# Patient Record
Sex: FEMALE | NOT HISPANIC OR LATINO | URBAN - METROPOLITAN AREA
[De-identification: names, ages, dates, MRNs, and addresses within clinical notes are randomized per-mention and may not be internally consistent; named-entity substitution may affect disease eponyms.]

---

## 2021-04-21 ENCOUNTER — OFFICE VISIT (OUTPATIENT)
Dept: URBAN - METROPOLITAN AREA CLINIC 35 | Facility: CLINIC | Age: 71
End: 2021-04-21

## 2021-04-21 VITALS
SYSTOLIC BLOOD PRESSURE: 118 MMHG | HEART RATE: 68 BPM | OXYGEN SATURATION: 96 % | DIASTOLIC BLOOD PRESSURE: 78 MMHG | WEIGHT: 180 LBS | HEIGHT: 68 IN | BODY MASS INDEX: 27.28 KG/M2 | TEMPERATURE: 98.7 F

## 2021-04-21 RX ORDER — SIMVASTATIN 20 MG/1
TAKE ONE TABLET BY MOUTH ONE TIME DAILY TABLET, FILM COATED ORAL
Qty: 90 UNSPECIFIED | Refills: 4 | Status: ACTIVE | COMMUNITY

## 2021-04-21 RX ORDER — CITALOPRAM HYDROBROMIDE 20 MG/1
1 TABLET TABLET ORAL ONCE A DAY
Status: ON HOLD | COMMUNITY

## 2021-04-21 RX ORDER — PANTOPRAZOLE SODIUM 40 MG/1
TAKE ONE TABLET BY MOUTH ONE TIME DAILY TABLET, DELAYED RELEASE ORAL
Qty: 30 UNSPECIFIED | Refills: 0 | Status: DISCONTINUED | COMMUNITY

## 2021-04-21 RX ORDER — CIPROFLOXACIN 500 MG/1
TAKE ONE TABLET BY MOUTH TWICE A DAY TABLET, FILM COATED ORAL
Qty: 14 UNSPECIFIED | Refills: 0 | Status: ACTIVE | COMMUNITY
End: 2021-04-22

## 2021-04-21 RX ORDER — OMEPRAZOLE 20 MG/1
1 CAPSULE CAPSULE, DELAYED RELEASE ORAL ONCE A DAY
Qty: 90 CAPSULE | Status: ACTIVE | COMMUNITY

## 2021-04-21 RX ORDER — ZOLPIDEM TARTRATE 10 MG/1
1 TABLET AT BEDTIME AS NEEDED TABLET, FILM COATED ORAL ONCE A DAY
Status: ACTIVE | COMMUNITY

## 2021-04-21 RX ORDER — SUCRALFATE 1 G/1
TAKE ONE TABLET BY MOUTH FOUR TIMES A DAY AS NEEDED FOR PAIN, TAKE 1 HOUR BEFORE MEALS AND AT BEDTIME TABLET ORAL
Qty: 30 UNSPECIFIED | Refills: 0 | Status: DISCONTINUED | COMMUNITY

## 2021-04-21 RX ORDER — EZETIMIBE 10 MG/1
TAKE ONE TABLET BY MOUTH ONE TIME DAILY TABLET ORAL
Qty: 90 UNSPECIFIED | Refills: 4 | Status: ACTIVE | COMMUNITY

## 2021-04-21 RX ORDER — LEVOTHYROXINE SODIUM 75 UG/1
TAKE ONE TABLET BY MOUTH ONE TIME DAILY TABLET ORAL
Qty: 90 UNSPECIFIED | Refills: 1 | Status: ACTIVE | COMMUNITY

## 2021-04-21 RX ORDER — DICYCLOMINE HYDROCHLORIDE 20 MG/1
1 TABLET TABLET ORAL
Qty: 30 | Refills: 0 | OUTPATIENT
Start: 2021-04-21

## 2021-04-21 RX ORDER — SERTRALINE HYDROCHLORIDE 100 MG/1
1 TABLET TABLET ORAL ONCE A DAY
Qty: 30 | Status: ACTIVE | COMMUNITY

## 2021-04-21 RX ORDER — LORAZEPAM 2 MG/1
1 TABLET AT BEDTIME AS NEEDED TABLET ORAL ONCE A DAY
Status: ACTIVE | COMMUNITY

## 2021-04-21 RX ORDER — AMLODIPINE BESYLATE AND BENAZEPRIL HYDROCHLORIDE 2.5; 1 MG/1; MG/1
1 CAPSULE CAPSULE ORAL ONCE A DAY
Status: ON HOLD | COMMUNITY

## 2021-04-21 RX ORDER — AMLODIPINE BESYLATE 10 MG/1
1 TABLET TABLET ORAL ONCE A DAY
Qty: 30 | Status: ACTIVE | COMMUNITY

## 2021-04-21 NOTE — EXAM-MIGRATED EXAMINATIONS
GENERAL APPEARANCE: - alert, in no acute distress, well developed, well nourished;   HEAD: - normocephalic, atraumatic;   EYES: - sclera anicteric bilaterally;   EARS: - normal;   ORAL CAVITY: - mucosa moist, MP3;   THROAT: - clear;   NECK/THYROID: - neck supple, full range of motion, no cervical lymphadenopathy, no thyroid nodules, no thyromegaly, trachea midline;   LYMPH NODES: - no cervical adenopathy, no supraclavicular adenopathy, no periumbilical adenopathy;   SKIN: - no suspicious lesions, warm and dry, no spider angiomata, palmar erythema or icterus;   HEART: - no murmurs, regular rate and rhythm, S1, S2 normal;   LUNGS: - clear to auscultation bilaterally, good air movement, no wheezes, rales, rhonchi;   ABDOMEN: - bowel sounds present, no masses palpable, no organomegaly , no rebound tenderness, soft,  + tender RUQ, epigastrum, mid upper abdomen, nondistended;   RECTAL: - deferred by patient;   MUSCULOSKELETAL: - normal posture, normal gait and station, no decreased range of motion;   EXTREMITIES: - no clubbing, cyanosis, or edema;   NEUROLOGIC: - cranial nerves 2-12 grossly intact;   PSYCH: - cooperative with exam, mood/affect full range;

## 2021-04-21 NOTE — HPI-MIGRATED HPI
;   ;     Constipation : Currently has 2 BMs a day, stools are normal without blood, mucus, or melena.     Last visit (07/11/2019) Patient presents today for a 2 weeks followup visit. Since last visit, patient has been taking Linzess 290 mcg one tab daily. She states that the Linzess was way too strong for her. She states that while on the Linzess, her stools were watery.  Her last bowel movement was on Monday, 3 days ago that was very loose.  On last visit 06/27/2019, Patient presents today with symptoms of constipation/diarrhea that started on May 20th, 2019 after coming back from [place]. Patient states that she will fluctuate between constipation/diarrhea. When she has diarrhea she will have runny stools that'll occur a couple times a week. When she is constipated she will have bowel movements but she won't feel like she is completely evacuating her stools. She has had issues with constipation in the past.  She will not have diarrhea unless she takes a laxative, otherwise it's just constipation.  Associated symptoms include gas, rare fecal inconsistence after she takes a laxative. She will also notice some mucus and a "leaf like" substance in her stools.   Her last bowel movement was this morning and she had formed and small pieces of stools. She is passing a lot of gas.  Patient has tried Senokot, magnesium citrate, Milk of Magnesia, Duloclax, prune juice with no relief of symptoms. Her last colonoscopy was in 2016 with Dr. Edmond.  She denies noctornal diarrhea, recent abx use, abdominal pain, blood, mucus, melena in stools.  COLONOSCOPY 04/13/2016 - Dr. Edmond Rectum: first degree hemorrhoids Sigmoid colon: diverticulosis of large intestine without perforation or abscess without bleeding, small and scattered. Descending colon: diverticulosis of large intestine without perforation or abscess without bleeding, small and scattered.;   Gastroesophageal Reflux : Patient presents today for a consult for GERD/Reflux.  She admits longstanding history of heartburn/reflux. Symptoms are described as severe daily heartburn and epigastric tenderness.  She feels symptoms have worsened for the past month.  Very occasional nausea.  She states even her bra is making her epigastrum hurt, and her mental state also affects it.  Pain is present all the time.  She states cold water helps her pain. Her pain has worsened since her son moved in.  She has tried Pantoprazole and Sucralfate medication with no relief of symptoms and is currently taking Prilosec OTC with no relief of symptoms as of yet.   She denies dysphagia, globus, sour eructations, bloating/gas, indigestion, early satiety.  She feels hungry all the time, and admits to dry mouth.  She denies decrease in appetite.  She is currently taking Cipro for a bladder infection 04/22/2021 is her last day of treatment.   Currently has 2 BMs a day, stools are normal without blood , mucus or melena.   Patient has had an EGD in [place] 1980's, she has not had a Barium Swallow Test before.    ;

## 2021-04-23 ENCOUNTER — TELEPHONE ENCOUNTER (OUTPATIENT)
Dept: URBAN - METROPOLITAN AREA CLINIC 35 | Facility: CLINIC | Age: 71
End: 2021-04-23

## 2021-05-05 ENCOUNTER — TELEPHONE ENCOUNTER (OUTPATIENT)
Dept: URBAN - METROPOLITAN AREA CLINIC 35 | Facility: CLINIC | Age: 71
End: 2021-05-05

## 2021-05-07 ENCOUNTER — TELEPHONE ENCOUNTER (OUTPATIENT)
Dept: URBAN - METROPOLITAN AREA CLINIC 35 | Facility: CLINIC | Age: 71
End: 2021-05-07

## 2021-05-07 ENCOUNTER — OFFICE VISIT (OUTPATIENT)
Dept: URBAN - METROPOLITAN AREA SURGERY CENTER 8 | Facility: SURGERY CENTER | Age: 71
End: 2021-05-07

## 2021-05-07 RX ORDER — CELECOXIB 100 MG/1
1 CAPSULE WITH FOOD CAPSULE ORAL TWICE A DAY
Qty: 30 CAPSULE | Refills: 0 | OUTPATIENT
Start: 2021-05-07

## 2021-05-28 ENCOUNTER — OFFICE VISIT (OUTPATIENT)
Dept: URBAN - METROPOLITAN AREA SURGERY CENTER 8 | Facility: SURGERY CENTER | Age: 71
End: 2021-05-28

## 2021-06-11 ENCOUNTER — OFFICE VISIT (OUTPATIENT)
Dept: URBAN - METROPOLITAN AREA CLINIC 35 | Facility: CLINIC | Age: 71
End: 2021-06-11

## 2021-06-25 ENCOUNTER — TELEPHONE ENCOUNTER (OUTPATIENT)
Dept: URBAN - METROPOLITAN AREA CLINIC 35 | Facility: CLINIC | Age: 71
End: 2021-06-25

## 2021-06-25 ENCOUNTER — OFFICE VISIT (OUTPATIENT)
Dept: URBAN - METROPOLITAN AREA CLINIC 35 | Facility: CLINIC | Age: 71
End: 2021-06-25

## 2021-06-25 ENCOUNTER — DASHBOARD ENCOUNTERS (OUTPATIENT)
Age: 71
End: 2021-06-25

## 2021-06-25 VITALS
OXYGEN SATURATION: 96 % | HEART RATE: 72 BPM | WEIGHT: 191 LBS | SYSTOLIC BLOOD PRESSURE: 118 MMHG | BODY MASS INDEX: 28.95 KG/M2 | DIASTOLIC BLOOD PRESSURE: 78 MMHG | HEIGHT: 68 IN

## 2021-06-25 PROBLEM — 196735001 CSG - CHRONIC SUPERFICIAL GASTRITIS: Status: ACTIVE | Noted: 2021-06-25

## 2021-06-25 PROBLEM — 72007001 DUODENITIS: Status: ACTIVE | Noted: 2021-06-25

## 2021-06-25 RX ORDER — CELECOXIB 100 MG/1
1 CAPSULE WITH FOOD CAPSULE ORAL TWICE A DAY
Qty: 30 CAPSULE | Refills: 0 | Status: ON HOLD | COMMUNITY
Start: 2021-05-07

## 2021-06-25 RX ORDER — CITALOPRAM HYDROBROMIDE 20 MG/1
1 TABLET TABLET ORAL ONCE A DAY
Status: ON HOLD | COMMUNITY

## 2021-06-25 RX ORDER — DICYCLOMINE HYDROCHLORIDE 20 MG/1
1 TABLET TABLET ORAL
Qty: 30 | Refills: 0 | Status: ON HOLD | COMMUNITY
Start: 2021-04-21

## 2021-06-25 RX ORDER — ZOLPIDEM TARTRATE 10 MG/1
1 TABLET AT BEDTIME AS NEEDED TABLET, FILM COATED ORAL ONCE A DAY
Qty: 30 TABLET | Refills: 0 | OUTPATIENT
Start: 2021-06-25

## 2021-06-25 RX ORDER — AMLODIPINE BESYLATE 10 MG/1
1 TABLET TABLET ORAL ONCE A DAY
Qty: 30 | Status: ACTIVE | COMMUNITY

## 2021-06-25 RX ORDER — ZOLPIDEM TARTRATE 10 MG/1
1 TABLET AT BEDTIME AS NEEDED TABLET, FILM COATED ORAL ONCE A DAY
Status: ACTIVE | COMMUNITY

## 2021-06-25 RX ORDER — PANTOPRAZOLE SODIUM 40 MG/1
1 TABLET TABLET, DELAYED RELEASE ORAL ONCE A DAY
Qty: 30 | Refills: 3 | OUTPATIENT
Start: 2021-06-25

## 2021-06-25 RX ORDER — LEVOTHYROXINE SODIUM 75 UG/1
TAKE ONE TABLET BY MOUTH ONE TIME DAILY TABLET ORAL
Qty: 90 UNSPECIFIED | Refills: 1 | Status: ACTIVE | COMMUNITY

## 2021-06-25 RX ORDER — SIMVASTATIN 20 MG/1
TAKE ONE TABLET BY MOUTH ONE TIME DAILY TABLET, FILM COATED ORAL
Qty: 90 UNSPECIFIED | Refills: 4 | Status: ACTIVE | COMMUNITY

## 2021-06-25 RX ORDER — AMLODIPINE BESYLATE AND BENAZEPRIL HYDROCHLORIDE 2.5; 1 MG/1; MG/1
1 CAPSULE CAPSULE ORAL ONCE A DAY
Status: ON HOLD | COMMUNITY

## 2021-06-25 RX ORDER — SERTRALINE HYDROCHLORIDE 100 MG/1
1 TABLET TABLET ORAL ONCE A DAY
Qty: 30 | Status: ACTIVE | COMMUNITY

## 2021-06-25 RX ORDER — LORAZEPAM 2 MG/1
1 TABLET AT BEDTIME AS NEEDED TABLET ORAL ONCE A DAY
Status: ACTIVE | COMMUNITY

## 2021-06-25 RX ORDER — OMEPRAZOLE 20 MG/1
1 CAPSULE CAPSULE, DELAYED RELEASE ORAL ONCE A DAY
Qty: 90 CAPSULE | Status: ON HOLD | COMMUNITY

## 2021-06-25 RX ORDER — EZETIMIBE 10 MG/1
TAKE ONE TABLET BY MOUTH ONE TIME DAILY TABLET ORAL
Qty: 90 UNSPECIFIED | Refills: 4 | Status: ACTIVE | COMMUNITY

## 2021-06-25 NOTE — HPI-MIGRATED HPI
;   ;   ;   ;   ;     Gas : Patient states she experiences gas approximately 20 minutes after she eats.  She states her symptoms started months ago. She states since she last came in April it has gotten worse.  She states she is belching and having flatulence frequently.;   Nausea : Patient states she experiences nausea everyday.  She states symptoms occur after she eats.  ;   Abdominal Pain : Patient has been experiencing RUQ pain since April.  She states the pain is dull when she eats it becomes like a pounding pain.  She has not tried taking any medication to relieve the pain.  ;   Constipation : Patient denies episodes of constipation since her last visit.  She admits to 1 bowel movement per day.  Stools are formed without blood, mucus or melena.  Last Visit (04/21/2021) Currently has 2 BMs a day, stools are normal without blood, mucus, or melena.     Last visit (07/11/2019) Patient presents today for a 2 weeks followup visit. Since last visit, patient has been taking Linzess 290 mcg one tab daily. She states that the Linzess was way too strong for her. She states that while on the Linzess, her stools were watery.  Her last bowel movement was on Monday, 3 days ago that was very loose.  On last visit 06/27/2019, Patient presents today with symptoms of constipation/diarrhea that started on May 20th, 2019 after coming back from [place]. Patient states that she will fluctuate between constipation/diarrhea. When she has diarrhea she will have runny stools that'll occur a couple times a week. When she is constipated she will have bowel movements but she won't feel like she is completely evacuating her stools. She has had issues with constipation in the past.  She will not have diarrhea unless she takes a laxative, otherwise it's just constipation.  Associated symptoms include gas, rare fecal inconsistence after she takes a laxative. She will also notice some mucus and a "leaf like" substance in her stools.   Her last bowel movement was this morning and she had formed and small pieces of stools. She is passing a lot of gas.  Patient has tried Senokot, magnesium citrate, Milk of Magnesia, Duloclax, prune juice with no relief of symptoms. Her last colonoscopy was in 2016 with Dr. Edmond.  She denies noctornal diarrhea, recent abx use, abdominal pain, blood, mucus, melena in stools.  COLONOSCOPY 04/13/2016 - Dr. Edmond Rectum: first degree hemorrhoids Sigmoid colon: diverticulosis of large intestine without perforation or abscess without bleeding, small and scattered. Descending colon: diverticulosis of large intestine without perforation or abscess without bleeding, small and scattered.;   Gastroesophageal Reflux : Patient presents today for follow up of GERD and to discuss her EGD results. She denies any complications after her procedure. Her EGD was completed on (06/07/2021) with Dr Lester results noted below.   She continues to experience acid reflux and states she has a sore throat and relates that to the acid reflux.   She denies any dysphagia, a globus sensation, any changes in her appetite or bowel habits.   Last Visit (04/21/2021) Patient presents today for a consult for GERD/Reflux.  She admits longstanding history of heartburn/reflux. Symptoms are described as severe daily heartburn and epigastric tenderness.  She feels symptoms have worsened for the past month.  Very occasional nausea.  She states even her bra is making her epigastrum hurt, and her mental state also affects it.  Pain is present all the time.  She states cold water helps her pain. Her pain has worsened since her son moved in.  She has tried Pantoprazole and Sucralfate medication with no relief of symptoms and is currently taking Prilosec OTC with no relief of symptoms as of yet.   She denies dysphagia, globus, sour eructations, bloating/gas, indigestion, early satiety.  She feels hungry all the time, and admits to dry mouth.  She denies decrease in appetite.  She is currently taking Cipro for a bladder infection 04/22/2021 is her last day of treatment.   Currently has 2 BMs a day, stools are normal without blood , mucus or melena.   Patient has had an EGD in [place] 1980's, she has not had a Barium Swallow Test before.;

## 2021-06-25 NOTE — EXAM-MIGRATED EXAMINATIONS
GENERAL APPEARANCE: - pleasant, well nourished, well developed, in no acute distress;   ORAL CAVITY: - mucosa moist;   HEART: - S1, S2 normal, regular rate and rhythm;   LUNGS: - clear to auscultation bilaterally;   ABDOMEN: - soft, BS present, + RUQ tender, nondistended, no rebound tenderness;